# Patient Record
Sex: MALE | Race: WHITE | Employment: FULL TIME | URBAN - METROPOLITAN AREA
[De-identification: names, ages, dates, MRNs, and addresses within clinical notes are randomized per-mention and may not be internally consistent; named-entity substitution may affect disease eponyms.]

---

## 2021-01-23 ENCOUNTER — APPOINTMENT (OUTPATIENT)
Dept: ULTRASOUND IMAGING | Age: 26
End: 2021-01-23
Payer: COMMERCIAL

## 2021-01-23 ENCOUNTER — HOSPITAL ENCOUNTER (EMERGENCY)
Age: 26
Discharge: HOME | End: 2021-01-23
Attending: EMERGENCY MEDICINE
Payer: COMMERCIAL

## 2021-01-23 VITALS
DIASTOLIC BLOOD PRESSURE: 54 MMHG | BODY MASS INDEX: 28.98 KG/M2 | OXYGEN SATURATION: 100 % | TEMPERATURE: 98 F | HEIGHT: 71 IN | SYSTOLIC BLOOD PRESSURE: 107 MMHG | RESPIRATION RATE: 16 BRPM | WEIGHT: 207 LBS | HEART RATE: 65 BPM

## 2021-01-23 DIAGNOSIS — I86.1 VARICOCELE: ICD-10-CM

## 2021-01-23 DIAGNOSIS — N50.811 PAIN IN BOTH TESTICLES: Primary | ICD-10-CM

## 2021-01-23 DIAGNOSIS — N50.812 PAIN IN BOTH TESTICLES: Primary | ICD-10-CM

## 2021-01-23 LAB
BACTERIA: NORMAL /HPF
BILIRUBIN URINE: NEGATIVE
BLOOD, URINE: NEGATIVE
CLARITY: CLEAR
COLOR: YELLOW
EPITHELIAL CELLS, UA: NORMAL /HPF
GLUCOSE URINE: NEGATIVE MG/DL
KETONES, URINE: NEGATIVE MG/DL
LEUKOCYTE ESTERASE, URINE: NEGATIVE
NITRITE, URINE: NEGATIVE
PH UA: 5.5 (ref 5–9)
PROTEIN UA: NEGATIVE MG/DL
RBC UA: NORMAL /HPF (ref 0–2)
SPECIFIC GRAVITY UA: 1.02 (ref 1–1.03)
UROBILINOGEN, URINE: 0.2 E.U./DL
WBC UA: NORMAL /HPF (ref 0–5)

## 2021-01-23 PROCEDURE — 93976 VASCULAR STUDY: CPT

## 2021-01-23 PROCEDURE — 99284 EMERGENCY DEPT VISIT MOD MDM: CPT

## 2021-01-23 PROCEDURE — 87491 CHLMYD TRACH DNA AMP PROBE: CPT

## 2021-01-23 PROCEDURE — 81001 URINALYSIS AUTO W/SCOPE: CPT

## 2021-01-23 PROCEDURE — 76870 US EXAM SCROTUM: CPT

## 2021-01-23 PROCEDURE — 87591 N.GONORRHOEAE DNA AMP PROB: CPT

## 2021-01-23 RX ORDER — DEXTROAMPHETAMINE SACCHARATE, AMPHETAMINE ASPARTATE, DEXTROAMPHETAMINE SULFATE AND AMPHETAMINE SULFATE 7.5; 7.5; 7.5; 7.5 MG/1; MG/1; MG/1; MG/1
30 TABLET ORAL DAILY
COMMUNITY

## 2021-01-23 ASSESSMENT — PAIN DESCRIPTION - ONSET: ONSET: GRADUAL

## 2021-01-23 ASSESSMENT — PAIN DESCRIPTION - ORIENTATION
ORIENTATION: LOWER;MID
ORIENTATION: MID

## 2021-01-23 ASSESSMENT — ENCOUNTER SYMPTOMS
SHORTNESS OF BREATH: 0
CHEST TIGHTNESS: 0
VOMITING: 0
PHOTOPHOBIA: 0
COUGH: 0
ABDOMINAL PAIN: 0
NAUSEA: 0
ABDOMINAL DISTENTION: 0
DIARRHEA: 0

## 2021-01-23 ASSESSMENT — PAIN DESCRIPTION - LOCATION
LOCATION: SCROTUM;ABDOMEN
LOCATION: SCROTUM

## 2021-01-23 ASSESSMENT — PAIN SCALES - GENERAL
PAINLEVEL_OUTOF10: 5
PAINLEVEL_OUTOF10: 4

## 2021-01-23 ASSESSMENT — PAIN DESCRIPTION - PAIN TYPE
TYPE: ACUTE PAIN
TYPE: ACUTE PAIN

## 2021-01-23 ASSESSMENT — PAIN DESCRIPTION - FREQUENCY: FREQUENCY: CONTINUOUS

## 2021-01-23 ASSESSMENT — PAIN DESCRIPTION - DESCRIPTORS: DESCRIPTORS: THROBBING;RADIATING

## 2021-01-23 NOTE — ED PROVIDER NOTES
Solis Cifuentes is a 22year old male without any significant PMH who presented to ED with concerns for groin swelling that has been present for 1 week and is worsening. Patient states that he has noticed he has had mild bilateral testicular swelling. Patient has tried elevation and ice with some mild improvement of symptoms. Patient does not know if he has a history of epididymitis. Patient denies any history of torsion. Patient states he has a history of a urinary tract infection. Patient denies any known STD exposure. Patient is concerned for possible STD as he is sexually active. Patient denies any genital lesions or discharge. Patient denies fever, chills, nausea, vomiting. Patient has chest pain or shortness of breath. Patient does not smoke and denies alcohol use. Patient denies drug use. The history is provided by the patient and medical records. Testicle Pain   This is a new problem. The current episode started 6 to 7 days ago. The problem occurs occasionally. The problem has been unchanged. The problem affects both sides. There is no reported injury. There is swelling in the left testicle. The color of the testicles is normal. There was no injury mechanism. The pain is moderate. The symptoms are relieved by elevation of the scrotum. The symptoms are aggravated by activity, certain positions and tactile pressure. Associated symptoms include testicular pain. Pertinent negatives include no chest pain, no chills, no fever, no abdominal pain, no diarrhea, no nausea, no vomiting, no dysuria, no hematuria, no penile pain, no urgency, no headaches, no flank pain, no cough, no shortness of breath and no rash. He is experiencing no difficulties urinating. He is sexually active. His past medical history does not include STD, testicular torsion, epididymitis or kidney stones. There were no sick contacts. Services received include tests performed and one or more referrals.         Review of Systems Constitutional: Negative for chills, diaphoresis, fatigue and fever. Eyes: Negative for photophobia and visual disturbance. Respiratory: Negative for cough, chest tightness and shortness of breath. Cardiovascular: Negative for chest pain and palpitations. Gastrointestinal: Negative for abdominal distention, abdominal pain, diarrhea, nausea and vomiting. Genitourinary: Positive for scrotal swelling and testicular pain. Negative for decreased urine volume, difficulty urinating, discharge, dysuria, flank pain, genital sores, hematuria, penile pain, penile swelling and urgency. Musculoskeletal: Negative for neck pain and neck stiffness. Skin: Negative for pallor and rash. Allergic/Immunologic: Negative for immunocompromised state. Neurological: Negative for headaches. Psychiatric/Behavioral: Negative for confusion. Physical Exam  Vitals signs and nursing note reviewed. Constitutional:       General: He is not in acute distress. Appearance: Normal appearance. He is not ill-appearing or diaphoretic. HENT:      Head: Normocephalic and atraumatic. Eyes:      General: No scleral icterus. Conjunctiva/sclera: Conjunctivae normal.      Pupils: Pupils are equal, round, and reactive to light. Neck:      Musculoskeletal: Normal range of motion and neck supple. No neck rigidity or muscular tenderness. Cardiovascular:      Rate and Rhythm: Normal rate and regular rhythm. Pulmonary:      Effort: Pulmonary effort is normal.      Breath sounds: Normal breath sounds. Abdominal:      General: Bowel sounds are normal. There is no distension. Palpations: Abdomen is soft. Tenderness: There is no abdominal tenderness. There is no guarding or rebound. Genitourinary:     Penis: Normal.       Testes: Cremasteric reflex is present. Comments: Normal testicular exam.  Left testicle is slightly enlarged and mildly tender.   Patient has normal color to testicles,   Musculoskeletal: Right lower leg: No edema. Left lower leg: No edema. Skin:     General: Skin is warm and dry. Capillary Refill: Capillary refill takes less than 2 seconds. Coloration: Skin is not pale. Findings: No erythema or rash. Neurological:      Mental Status: He is alert and oriented to person, place, and time. Psychiatric:         Mood and Affect: Mood normal.          Procedures     MDM  Number of Diagnoses or Management Options  Pain in both testicles  Varicocele  Diagnosis management comments: Sumaya Andrade is a 61-year-old male who present emergency department with concern for testicular pain. Patient was found to have a variceal to the left testicle. Patient denies any findings concerning for torsion or epididymitis. Patient appeared comfortable at time of reevaluation and was not in any acute distress. Patient not require any pain medicine while in emergency department. Patient UA was unremarkable for UTI. Patient requested STD testing. Patient is not having any discharge and does not want prophylactic treatment. GC is pending. Patient will be referred to urology and primary care physician for further evaluation and monitoring. Discussed results and plan with patient along with indications return to ED patient is agreeable to plan.                   --------------------------------------------- PAST HISTORY ---------------------------------------------  Past Medical History:  has no past medical history on file. Past Surgical History:  has a past surgical history that includes Adenoidectomy and Myringotomy Tympanostomy Tube Placement. Social History:  reports that he has never smoked. His smokeless tobacco use includes snuff. He reports current alcohol use. He reports that he does not use drugs. Family History: family history is not on file. The patients home medications have been reviewed.     Allergies: Patient has no known allergies. -------------------------------------------------- RESULTS -------------------------------------------------  Labs:  Results for orders placed or performed during the hospital encounter of 01/23/21   C.trachomatis N.gonorrhoeae DNA, Urine    Specimen: Urine   Result Value Ref Range    Source Urine    Urinalysis with Microscopic   Result Value Ref Range    Color, UA Yellow Straw/Yellow    Clarity, UA Clear Clear    Glucose, Ur Negative Negative mg/dL    Bilirubin Urine Negative Negative    Ketones, Urine Negative Negative mg/dL    Specific Gravity, UA 1.025 1.005 - 1.030    Blood, Urine Negative Negative    pH, UA 5.5 5.0 - 9.0    Protein, UA Negative Negative mg/dL    Urobilinogen, Urine 0.2 <2.0 E.U./dL    Nitrite, Urine Negative Negative    Leukocyte Esterase, Urine Negative Negative    WBC, UA 0-1 0 - 5 /HPF    RBC, UA 0-1 0 - 2 /HPF    Epithelial Cells, UA NONE SEEN /HPF    Bacteria, UA NONE SEEN None Seen /HPF       Radiology:  US DUP ABD PEL RETRO SCROT LIMITED   Final Result   1. No evidence of orchitis, epididymitis or torsion. 2. Left-sided varicocele. 3. No hydrocele. 4. The left testicle is somewhat larger than the right, which is of an   unclear etiology and may represent developmental variation. US SCROTUM AND TESTICLES   Final Result   1. No evidence of orchitis, epididymitis or torsion. 2. Left-sided varicocele. 3. No hydrocele. 4. The left testicle is somewhat larger than the right, which is of an   unclear etiology and may represent developmental variation.          ------------------------- NURSING NOTES AND VITALS REVIEWED ---------------------------  Date / Time Roomed:  1/23/2021 10:11 AM  ED Bed Assignment:  22/22    The nursing notes within the ED encounter and vital signs as below have been reviewed.    BP (!) 107/54   Pulse 65   Temp 98 °F (36.7 °C)   Resp 16   Ht 5' 11\" (1.803 m)   Wt 207 lb (93.9 kg)   SpO2 100%   BMI 28.87 kg/m²   Oxygen Saturation Interpretation: Normal      ------------------------------------------ PROGRESS NOTES ------------------------------------------  5:20 PM EST  I have spoken with the patient and discussed todays results, in addition to providing specific details for the plan of care and counseling regarding the diagnosis and prognosis. Their questions are answered at this time and they are agreeable with the plan. I discussed at length with them reasons for immediate return here for re evaluation. They will followup with their primary care physician by calling their office on Monday.      --------------------------------- ADDITIONAL PROVIDER NOTES ---------------------------------  At this time the patient is without objective evidence of an acute process requiring hospitalization or inpatient management. They have remained hemodynamically stable throughout their entire ED visit and are stable for discharge with outpatient follow-up. The plan has been discussed in detail and they are aware of the specific conditions for emergent return, as well as the importance of follow-up. Discharge Medication List as of 1/23/2021  2:35 PM          Diagnosis:  1. Pain in both testicles    2. Varicocele        Disposition:  Patient's disposition: Discharge to home  Patient's condition is stable. Zachary Simmons MD  Resident  01/23/21 8735      Addendum to chart  I was called by radiology services with a addendum to the ultrasound from yesterday. There was concern he may have intermittent torsion of the left testicle. I did speak with the radiologist on-call. Multiple attempts were made to reach the patient. Eventually we got a hold of him on his cell phone at 1300 hrs. today. I did speak with staff. He states he is having continued pain of the left testicle. I did review the ultrasound discrepancy and findings. Recommend that he return the emergency department and will reultrasound his left testicle.   There was concern for intermittent torsion. I did explain to him what torsion meant. He was agreeable and will proceed to [facility] emergency department as quickly as possible.        Luanne Estrada,   01/24/21 [id number]

## 2021-01-24 ENCOUNTER — APPOINTMENT (OUTPATIENT)
Dept: ULTRASOUND IMAGING | Age: 26
End: 2021-01-24
Payer: COMMERCIAL

## 2021-01-24 ENCOUNTER — HOSPITAL ENCOUNTER (EMERGENCY)
Age: 26
Discharge: HOME | End: 2021-01-24
Attending: EMERGENCY MEDICINE
Payer: COMMERCIAL

## 2021-01-24 VITALS
HEART RATE: 78 BPM | RESPIRATION RATE: 16 BRPM | DIASTOLIC BLOOD PRESSURE: 82 MMHG | WEIGHT: 207 LBS | SYSTOLIC BLOOD PRESSURE: 152 MMHG | BODY MASS INDEX: 28.98 KG/M2 | TEMPERATURE: 98.8 F | HEIGHT: 71 IN | OXYGEN SATURATION: 100 %

## 2021-01-24 DIAGNOSIS — N45.3 ORCHITIS AND EPIDIDYMITIS: Primary | ICD-10-CM

## 2021-01-24 PROCEDURE — 6370000000 HC RX 637 (ALT 250 FOR IP): Performed by: EMERGENCY MEDICINE

## 2021-01-24 PROCEDURE — 93975 VASCULAR STUDY: CPT

## 2021-01-24 PROCEDURE — 99282 EMERGENCY DEPT VISIT SF MDM: CPT

## 2021-01-24 PROCEDURE — 76870 US EXAM SCROTUM: CPT

## 2021-01-24 RX ORDER — CEFDINIR 300 MG/1
300 CAPSULE ORAL ONCE
Status: COMPLETED | OUTPATIENT
Start: 2021-01-24 | End: 2021-01-24

## 2021-01-24 RX ORDER — CEFDINIR 300 MG/1
300 CAPSULE ORAL 2 TIMES DAILY
Qty: 14 CAPSULE | Refills: 0 | Status: SHIPPED | OUTPATIENT
Start: 2021-01-24 | End: 2021-01-31

## 2021-01-24 RX ADMIN — CEFDINIR 300 MG: 300 CAPSULE ORAL at 16:13

## 2021-01-24 NOTE — ED PROVIDER NOTES
HPI:  1/24/21,   Time: 2:13 PM TANJA Koch is a 22 y.o. male presenting to the ED for Ongoing left scrotal pain and swelling , beginning several days  ago. The complaint has been intermittent, mild in severity, and worsened by recent intercourse. Patient has ongoing testicular pain and swelling. Started approximately 1 week ago after intercourse. No known STD exposures. He was seen yesterday in the emergency department had swelling bilaterally left greater than right. He was sent for duplex ultrasound which was initially read as hydrocele no epididymitis. I did speak with the radiologist today who felt that he may have intermittent torsion of the left testicle. I did call the patient and said he is having ongoing pain. I encouraged him to return the emergency department today for reevaluation. Has no nausea fever. He denies any urinary complaints. No diarrhea reported. He has no headache penile pain or urgency or flank pain. ROS:   Pertinent positives and negatives are stated within HPI, all other systems reviewed and are negative.  --------------------------------------------- PAST HISTORY ---------------------------------------------  Past Medical History:  has no past medical history on file. Past Surgical History:  has a past surgical history that includes Adenoidectomy and Myringotomy Tympanostomy Tube Placement. Social History:  reports that he has never smoked. His smokeless tobacco use includes snuff. He reports current alcohol use. He reports that he does not use drugs. Family History: family history is not on file. The patients home medications have been reviewed. Allergies: Patient has no known allergies.     -------------------------------------------------- RESULTS -------------------------------------------------  All laboratory and radiology results have been personally reviewed by myself   LABS:  No results found for this visit on 01/24/21. RADIOLOGY:  Interpreted by Radiologist.  [address] DUP ABD PEL RETRO SCROT COMPLETE   Final Result   1. Compared to the sonogram from yesterday, the left testicle appears   somewhat more hyperemic than the contralateral side. Findings may represent   epididymo-orchitis or a torsion-detorsion phenomenon. 2. Left varicocele. 3. The left testicle is somewhat larger than the right but otherwise normal   in contour and echogenicity. US SCROTUM AND TESTICLES   Final Result   1. Compared to the sonogram from yesterday, the left testicle appears   somewhat more hyperemic than the contralateral side. Findings may represent   epididymo-orchitis or a torsion-detorsion phenomenon. 2. Left varicocele. 3. The left testicle is somewhat larger than the right but otherwise normal   in contour and echogenicity.          ------------------------- NURSING NOTES AND VITALS REVIEWED ---------------------------   The nursing notes within the ED encounter and vital signs as below have been reviewed. BP (!) 152/82   Pulse 78   Temp 98.8 °F (37.1 °C) (Oral)   Resp 16   Ht 5' 11\" (1.803 m)   Wt 207 lb (93.9 kg)   SpO2 100%   BMI 28.87 kg/m²   Oxygen Saturation Interpretation: Normal      ---------------------------------------------------PHYSICAL EXAM--------------------------------------      Constitutional/General: Alert and oriented x3, well appearing, non toxic in NAD  Head: NC/AT  Eyes: PERRL, EOMI  Mouth: Oropharynx clear, handling secretions, no trismus  Neck: Supple, full ROM, no meningeal signs  Pulmonary: Lungs clear to auscultation bilaterally, no wheezes, rales, or rhonchi. Not in respiratory distress  Cardiovascular:  Regular rate and rhythm, no murmurs, gallops, or rubs. 2+ distal pulses  Abdomen: Soft, non tender, non distended,    exam deferred at this time patient's in the hallway. Extremities: Moves all extremities x 4.  Warm and well perfused  Skin: warm and dry without rash  Neurologic: GCS 15, no focal deficits. Psych: Normal Affect      ------------------------------ ED COURSE/MEDICAL DECISION MAKING----------------------  Medications   cefdinir (OMNICEF) capsule 300 mg (has no administration in time range)         Medical Decision Making:    Patient sent for duplex ultrasound of the testicles concern for intermittent torsion the left testicle. Testicular  ultrasound shows hyper anemia of the left testes and epididymis. Concern for orchitis epididymitis so possible intermittent torsion or patient's symptoms been ongoing for a week. He will be discharged on Omnicef he was given 1 dose in the emergency department. Counseling: The emergency provider has spoken with the patient and discussed todays results, in addition to providing specific details for the plan of care and counseling regarding the diagnosis and prognosis. Questions are answered at this time and they are agreeable with the plan.      --------------------------------- IMPRESSION AND DISPOSITION ---------------------------------    IMPRESSION  1.  Orchitis and epididymitis New Problem       DISPOSITION  Disposition: Discharge to home  Patient condition is stable                Alyce Johnston DO  01/24/21 1539

## 2021-01-24 NOTE — ED NOTES
Bed:  [place] [address]  Expected date:   Expected time:   Means of arrival:   Comments:  LETA Bangura  01/24/21 2794

## 2021-01-27 LAB
C. TRACHOMATIS DNA ,URINE: NEGATIVE
N. GONORRHOEAE DNA, URINE: NEGATIVE
SOURCE: NORMAL

## 2024-03-06 ENCOUNTER — HOSPITAL ENCOUNTER (OUTPATIENT)
Age: 29
Discharge: HOME | End: 2024-03-08
Payer: COMMERCIAL

## 2024-03-06 ENCOUNTER — HOSPITAL ENCOUNTER (OUTPATIENT)
Dept: GENERAL RADIOLOGY | Age: 29
Discharge: HOME | End: 2024-03-08
Payer: COMMERCIAL

## 2024-03-06 DIAGNOSIS — M79.642 LEFT HAND PAIN: ICD-10-CM

## 2024-03-06 DIAGNOSIS — M25.442 EFFUSION OF JOINT OF LEFT HAND: ICD-10-CM

## 2024-03-06 PROCEDURE — 73130 X-RAY EXAM OF HAND: CPT
